# Patient Record
Sex: MALE | Race: WHITE | NOT HISPANIC OR LATINO | Employment: OTHER | URBAN - METROPOLITAN AREA
[De-identification: names, ages, dates, MRNs, and addresses within clinical notes are randomized per-mention and may not be internally consistent; named-entity substitution may affect disease eponyms.]

---

## 2017-06-19 ENCOUNTER — OFFICE VISIT (OUTPATIENT)
Dept: INTERNAL MEDICINE | Facility: CLINIC | Age: 42
End: 2017-06-19

## 2017-06-19 VITALS
WEIGHT: 215.2 LBS | SYSTOLIC BLOOD PRESSURE: 142 MMHG | DIASTOLIC BLOOD PRESSURE: 82 MMHG | OXYGEN SATURATION: 99 % | HEART RATE: 89 BPM | BODY MASS INDEX: 29.15 KG/M2 | HEIGHT: 72 IN

## 2017-06-19 DIAGNOSIS — Z13.220 SCREENING FOR HYPERLIPIDEMIA: ICD-10-CM

## 2017-06-19 DIAGNOSIS — F10.10 ALCOHOL ABUSE: ICD-10-CM

## 2017-06-19 DIAGNOSIS — R07.2 PRECORDIAL PAIN: ICD-10-CM

## 2017-06-19 DIAGNOSIS — R00.2 INTERMITTENT PALPITATIONS: Primary | ICD-10-CM

## 2017-06-19 DIAGNOSIS — R01.1 HEART MURMUR: ICD-10-CM

## 2017-06-19 DIAGNOSIS — K21.9 GASTROESOPHAGEAL REFLUX DISEASE WITHOUT ESOPHAGITIS: ICD-10-CM

## 2017-06-19 DIAGNOSIS — Z13.1 SCREENING FOR DIABETES MELLITUS: ICD-10-CM

## 2017-06-19 DIAGNOSIS — IMO0001 ELEVATED BLOOD PRESSURE: ICD-10-CM

## 2017-06-19 PROBLEM — M10.9 GOUT INVOLVING TOE OF LEFT FOOT: Status: ACTIVE | Noted: 2017-06-19

## 2017-06-19 PROCEDURE — 99204 OFFICE O/P NEW MOD 45 MIN: CPT | Performed by: INTERNAL MEDICINE

## 2017-06-19 PROCEDURE — 93000 ELECTROCARDIOGRAM COMPLETE: CPT | Performed by: INTERNAL MEDICINE

## 2017-06-19 RX ORDER — OMEPRAZOLE 40 MG/1
40 CAPSULE, DELAYED RELEASE ORAL DAILY
COMMUNITY
End: 2017-07-03

## 2017-06-19 RX ORDER — ALLOPURINOL 100 MG/1
100 TABLET ORAL 2 TIMES DAILY
Refills: 5 | COMMUNITY
Start: 2017-05-09

## 2017-06-19 NOTE — PROGRESS NOTES
"Beck Vazquez is a 42 y.o. male, who presents with a chief complaint of   Chief Complaint   Patient presents with   • Establish Care   • Nasal Congestion     x4 days, sore throat, pt states feeling better   • Cough     \"dry cough\" since winter    • Chest Pain   • Palpitations       HPI Comments: Patient is here to establish care and has not seen a primary care doctor in 7 years. He has had dry cough for 6 months. He was thought to have GERD by a ENT doctor and was started on PPI which has helped. He states that he still eats a bad diet. All of his problems are new to me.     He has chest pain for months on the left side. Stress causes the pain to come on. The pain is a tingle and will last seconds. The pain goes away on own and worse when he exerts himself. He had full physical about 6 years ago and he had high TGs in the past, but has otherwise not seen a physician. He drinks about 4-6 beers per night. Never a TOB smoker, but does smoke marijuana.     He does snore at night and does not wake him self up. He has elevated BP here, but does not have SOB or dizziness. He is not sure if his BP has been elevated in the past.        The following portions of the patient's history were reviewed and updated as appropriate: allergies, current medications, past family history, past medical history, past social history, past surgical history and problem list.    Allergies: Review of patient's allergies indicates no known allergies.    Current Outpatient Prescriptions:   •  allopurinol (ZYLOPRIM) 100 MG tablet, Take 100 mg by mouth 2 (Two) Times a Day., Disp: , Rfl: 5  •  omeprazole (priLOSEC) 40 MG capsule, Take 40 mg by mouth Daily., Disp: , Rfl:       Review of Systems   Constitutional: Negative for chills, fatigue and fever.   HENT: Negative for congestion and rhinorrhea.    Respiratory: Negative for cough and shortness of breath.    Cardiovascular: Positive for chest pain and palpitations. Negative for leg " "swelling.   Gastrointestinal: Negative for diarrhea, nausea and vomiting.   Genitourinary: Negative for difficulty urinating and dysuria.   Musculoskeletal: Negative for arthralgias.   Skin: Negative for rash.   Neurological: Negative for dizziness and headaches.   Hematological: Negative for adenopathy.       Objective     /82 (BP Location: Left arm, Patient Position: Sitting, Cuff Size: Adult)  Pulse 89  Ht 72\" (182.9 cm)  Wt 215 lb 3.2 oz (97.6 kg)  SpO2 99%  BMI 29.19 kg/m2        Physical Exam   Constitutional: He is oriented to person, place, and time. He appears well-developed and well-nourished. No distress.   HENT:   Head: Normocephalic and atraumatic.   Right Ear: Tympanic membrane and external ear normal.   Left Ear: Tympanic membrane and external ear normal.   Nose: Nose normal.   Mouth/Throat: Uvula is midline, oropharynx is clear and moist and mucous membranes are normal. No oropharyngeal exudate. Tonsils are 0 on the right. Tonsils are 0 on the left. No tonsillar exudate.   Eyes: Conjunctivae are normal. Right eye exhibits no discharge. Left eye exhibits no discharge. No scleral icterus.   Neck: Neck supple.   Cardiovascular: Normal rate and regular rhythm.  Exam reveals no gallop and no friction rub.    Murmur heard.   Systolic (Geary best left of the lower sternal area) murmur is present with a grade of 2/6   Pulmonary/Chest: Effort normal and breath sounds normal. No respiratory distress. He has no wheezes. He has no rales.   Abdominal: Soft. Bowel sounds are normal. He exhibits no distension and no mass. There is no tenderness. There is no guarding.   Musculoskeletal: He exhibits no edema or tenderness.   Lymphadenopathy:     He has no cervical adenopathy.   Neurological: He is alert and oriented to person, place, and time. No cranial nerve deficit. He exhibits normal muscle tone.   Skin: Skin is warm. No rash noted.   Psychiatric: He has a normal mood and affect. His behavior is " normal.   Nursing note and vitals reviewed.      ECG 12 Lead  Date/Time: 6/19/2017 4:42 PM  Performed by: TRACY VAZQUEZ  Authorized by: TRACY VAZQUEZ   Comparison: not compared with previous ECG   Previous ECG: no previous ECG available  Rhythm: sinus rhythm  Rate: normal  ST Segments: ST segments normal  T Waves: T waves normal  QRS axis: normal  Other: no other findings  Clinical impression: normal ECG              No results found for this or any previous visit.    Assessment/Plan   David was seen today for establish care, nasal congestion, cough, chest pain and palpitations.    Diagnoses and all orders for this visit:    Intermittent palpitations  -     CBC & Differential  -     Comprehensive Metabolic Panel  -     TSH  -     T4, Free  -     Urinalysis With / Culture If Indicated  -     Holter Monitor - 24 Hour  -     Echocardiogram Doppler    Heart murmur  -     Echocardiogram Doppler    Elevated blood pressure    Gastroesophageal reflux disease without esophagitis    Precordial pain  -     CBC & Differential  -     Comprehensive Metabolic Panel  -     ECG 12 Lead  -     Echocardiogram Doppler    Alcohol abuse    Screening for hyperlipidemia  -     Lipid Panel With LDL / HDL Ratio    Screening for diabetes mellitus  -     Comprehensive Metabolic Panel      I am concerned with his intermittent chest pain (that sounds typical at times) and his palpitations that he needs further workup. EKG was normal here today. Will send for echo and Holter to work up palpitations and heart murmur. If still having pain, I do think that he needs a stress test and will do okay with treadmill stress. Risk factors include his inactivity, family history and obesity.     His dry cough does seem to be related to GERD. I have encouraged him to lose weight and stop drinking as much as he is to see if this will help.    He also has elevated BP which is a new diagnosis. He likely has untreated HTN, but will start workup for CP and  palpations and start DASH diet and bring him back for follow up. If still high, will start on BP medication likely lisinopril or BB give that he has gout and will not do well with HCTZ or diuretic.     Return in about 2 weeks (around 7/3/2017) for Recheck of CP .    Deborah Lindsey MD  06/19/2017

## 2017-06-19 NOTE — PATIENT INSTRUCTIONS
"DASH Eating Plan  DASH stands for \"Dietary Approaches to Stop Hypertension.\" The DASH eating plan is a healthy eating plan that has been shown to reduce high blood pressure (hypertension). Additional health benefits may include reducing the risk of type 2 diabetes mellitus, heart disease, and stroke. The DASH eating plan may also help with weight loss.  WHAT DO I NEED TO KNOW ABOUT THE DASH EATING PLAN?  For the DASH eating plan, you will follow these general guidelines:  · Choose foods with less than 150 milligrams of sodium per serving (as listed on the food label).  · Use salt-free seasonings or herbs instead of table salt or sea salt.  · Check with your health care provider or pharmacist before using salt substitutes.  · Eat lower-sodium products. These are often labeled as \"low-sodium\" or \"no salt added.\"  · Eat fresh foods. Avoid eating a lot of canned foods.  · Eat more vegetables, fruits, and low-fat dairy products.  · Choose whole grains. Look for the word \"whole\" as the first word in the ingredient list.  · Choose fish and skinless chicken or turkey more often than red meat. Limit fish, poultry, and meat to 6 oz (170 g) each day.  · Limit sweets, desserts, sugars, and sugary drinks.  · Choose heart-healthy fats.  · Eat more home-cooked food and less restaurant, buffet, and fast food.  · Limit fried foods.  · Do not kimball foods. Cook foods using methods such as baking, boiling, grilling, and broiling instead.  · When eating at a restaurant, ask that your food be prepared with less salt, or no salt if possible.  WHAT FOODS CAN I EAT?  Seek help from a dietitian for individual calorie needs.  Grains  Whole grain or whole wheat bread. Brown rice. Whole grain or whole wheat pasta. Quinoa, bulgur, and whole grain cereals. Low-sodium cereals. Corn or whole wheat flour tortillas. Whole grain cornbread. Whole grain crackers. Low-sodium crackers.  Vegetables  Fresh or frozen vegetables (raw, steamed, roasted, or " grilled). Low-sodium or reduced-sodium tomato and vegetable juices. Low-sodium or reduced-sodium tomato sauce and paste. Low-sodium or reduced-sodium canned vegetables.   Fruits  All fresh, canned (in natural juice), or frozen fruits.  Meat and Other Protein Products  Ground beef (85% or leaner), grass-fed beef, or beef trimmed of fat. Skinless chicken or turkey. Ground chicken or turkey. Pork trimmed of fat. All fish and seafood. Eggs. Dried beans, peas, or lentils. Unsalted nuts and seeds. Unsalted canned beans.  Dairy  Low-fat dairy products, such as skim or 1% milk, 2% or reduced-fat cheeses, low-fat ricotta or cottage cheese, or plain low-fat yogurt. Low-sodium or reduced-sodium cheeses.  Fats and Oils  Tub margarines without trans fats. Light or reduced-fat mayonnaise and salad dressings (reduced sodium). Avocado. Safflower, olive, or canola oils. Natural peanut or almond butter.  Other  Unsalted popcorn and pretzels.  The items listed above may not be a complete list of recommended foods or beverages. Contact your dietitian for more options.  WHAT FOODS ARE NOT RECOMMENDED?  Grains  White bread. White pasta. White rice. Refined cornbread. Bagels and croissants. Crackers that contain trans fat.  Vegetables  Creamed or fried vegetables. Vegetables in a cheese sauce. Regular canned vegetables. Regular canned tomato sauce and paste. Regular tomato and vegetable juices.  Fruits  Canned fruit in light or heavy syrup. Fruit juice.  Meat and Other Protein Products  Fatty cuts of meat. Ribs, chicken wings, camarena, sausage, bologna, salami, chitterlings, fatback, hot dogs, bratwurst, and packaged luncheon meats. Salted nuts and seeds. Canned beans with salt.  Dairy  Whole or 2% milk, cream, half-and-half, and cream cheese. Whole-fat or sweetened yogurt. Full-fat cheeses or blue cheese. Nondairy creamers and whipped toppings. Processed cheese, cheese spreads, or cheese curds.  Condiments  Onion and garlic salt, seasoned  salt, table salt, and sea salt. Canned and packaged gravies. Worcestershire sauce. Tartar sauce. Barbecue sauce. Teriyaki sauce. Soy sauce, including reduced sodium. Steak sauce. Fish sauce. Oyster sauce. Cocktail sauce. Horseradish. Ketchup and mustard. Meat flavorings and tenderizers. Bouillon cubes. Hot sauce. Tabasco sauce. Marinades. Taco seasonings. Relishes.  Fats and Oils  Butter, stick margarine, lard, shortening, ghee, and camarena fat. Coconut, palm kernel, or palm oils. Regular salad dressings.  Other  Pickles and olives. Salted popcorn and pretzels.  The items listed above may not be a complete list of foods and beverages to avoid. Contact your dietitian for more information.  WHERE CAN I FIND MORE INFORMATION?  National [employer], [employer], [employer] [place]: www.nhlbi.nih.gov/health/health-topics/topics/dash/     This information is not intended to replace advice given to you by your health care provider. Make sure you discuss any questions you have with your health care provider.     Document Released: 12/06/2012 Document Revised: 04/10/2017 Document Reviewed: 10/22/2014  Elsevier Interactive Patient Education ©2017 [employer]

## 2017-06-27 ENCOUNTER — TELEPHONE (OUTPATIENT)
Dept: INTERNAL MEDICINE | Facility: CLINIC | Age: 42
End: 2017-06-27

## 2017-06-27 LAB
ALBUMIN SERPL-MCNC: 5 G/DL (ref 3.5–5.5)
ALBUMIN/GLOB SERPL: 2.3 {RATIO} (ref 1.2–2.2)
ALP SERPL-CCNC: 59 IU/L (ref 39–117)
ALT SERPL-CCNC: 85 IU/L (ref 0–44)
APPEARANCE UR: CLEAR
AST SERPL-CCNC: 56 IU/L (ref 0–40)
BACTERIA #/AREA URNS HPF: NORMAL /[HPF]
BASOPHILS # BLD AUTO: 0.1 X10E3/UL (ref 0–0.2)
BASOPHILS NFR BLD AUTO: 2 %
BILIRUB SERPL-MCNC: 0.4 MG/DL (ref 0–1.2)
BILIRUB UR QL STRIP: NEGATIVE
BUN SERPL-MCNC: 18 MG/DL (ref 6–24)
BUN/CREAT SERPL: 18 (ref 9–20)
CALCIUM SERPL-MCNC: 9.5 MG/DL (ref 8.7–10.2)
CHLORIDE SERPL-SCNC: 101 MMOL/L (ref 96–106)
CHOLEST SERPL-MCNC: 215 MG/DL (ref 100–199)
CO2 SERPL-SCNC: 25 MMOL/L (ref 18–29)
COLOR UR: YELLOW
CREAT SERPL-MCNC: 1 MG/DL (ref 0.76–1.27)
EOSINOPHIL # BLD AUTO: 0.1 X10E3/UL (ref 0–0.4)
EOSINOPHIL NFR BLD AUTO: 2 %
EPI CELLS #/AREA URNS HPF: NORMAL /HPF
ERYTHROCYTE [DISTWIDTH] IN BLOOD BY AUTOMATED COUNT: 13.4 % (ref 12.3–15.4)
GLOBULIN SER CALC-MCNC: 2.2 G/DL (ref 1.5–4.5)
GLUCOSE SERPL-MCNC: 93 MG/DL (ref 65–99)
GLUCOSE UR QL: NEGATIVE
HCT VFR BLD AUTO: 41.6 % (ref 37.5–51)
HDLC SERPL-MCNC: 47 MG/DL
HGB BLD-MCNC: 14 G/DL (ref 12.6–17.7)
HGB UR QL STRIP: NEGATIVE
IMM GRANULOCYTES # BLD: 0 X10E3/UL (ref 0–0.1)
IMM GRANULOCYTES NFR BLD: 0 %
KETONES UR QL STRIP: NEGATIVE
LDLC SERPL CALC-MCNC: 125 MG/DL (ref 0–99)
LDLC/HDLC SERPL: 2.7 RATIO UNITS (ref 0–3.6)
LEUKOCYTE ESTERASE UR QL STRIP: NEGATIVE
LYMPHOCYTES # BLD AUTO: 1.9 X10E3/UL (ref 0.7–3.1)
LYMPHOCYTES NFR BLD AUTO: 37 %
MCH RBC QN AUTO: 30.3 PG (ref 26.6–33)
MCHC RBC AUTO-ENTMCNC: 33.7 G/DL (ref 31.5–35.7)
MCV RBC AUTO: 90 FL (ref 79–97)
MICRO URNS: NORMAL
MICRO URNS: NORMAL
MONOCYTES # BLD AUTO: 0.6 X10E3/UL (ref 0.1–0.9)
MONOCYTES NFR BLD AUTO: 12 %
MUCOUS THREADS URNS QL MICRO: PRESENT
NEUTROPHILS # BLD AUTO: 2.4 X10E3/UL (ref 1.4–7)
NEUTROPHILS NFR BLD AUTO: 47 %
NITRITE UR QL STRIP: NEGATIVE
PH UR STRIP: 6 [PH] (ref 5–7.5)
PLATELET # BLD AUTO: 190 X10E3/UL (ref 150–379)
POTASSIUM SERPL-SCNC: 4.5 MMOL/L (ref 3.5–5.2)
PROT SERPL-MCNC: 7.2 G/DL (ref 6–8.5)
PROT UR QL STRIP: NEGATIVE
RBC # BLD AUTO: 4.62 X10E6/UL (ref 4.14–5.8)
RBC #/AREA URNS HPF: NORMAL /HPF
SODIUM SERPL-SCNC: 142 MMOL/L (ref 134–144)
SP GR UR: 1.02 (ref 1–1.03)
T4 FREE SERPL-MCNC: 1 NG/DL (ref 0.82–1.77)
TRIGL SERPL-MCNC: 217 MG/DL (ref 0–149)
TSH SERPL DL<=0.005 MIU/L-ACNC: 3.52 UIU/ML (ref 0.45–4.5)
URINALYSIS REFLEX: NORMAL
UROBILINOGEN UR STRIP-MCNC: 0.2 MG/DL (ref 0.2–1)
VLDLC SERPL CALC-MCNC: 43 MG/DL (ref 5–40)
WBC # BLD AUTO: 5.1 X10E3/UL (ref 3.4–10.8)
WBC #/AREA URNS HPF: NORMAL /HPF

## 2017-06-27 NOTE — TELEPHONE ENCOUNTER
----- Message from Deborah Lindsey MD sent at 6/27/2017  7:50 AM EDT -----  Please call patient with these results and let him know that they look fairly good, but his liver enzymes and cholesterol are both elevated which will be improved with diet and exercise and reducing the alcohol that he drinks. We will discuss more next week about rechecking labs and management.   Pt given lab results.[email or web address]

## 2017-06-27 NOTE — PROGRESS NOTES
Please call patient with these results and let him know that they look fairly good, but his liver enzymes and cholesterol are both elevated which will be improved with diet and exercise and reducing the alcohol that he drinks. We will discuss more next week about rechecking labs and management.

## 2017-06-28 ENCOUNTER — HOSPITAL ENCOUNTER (OUTPATIENT)
Dept: CARDIOLOGY | Facility: HOSPITAL | Age: 42
Discharge: HOME | End: 2017-06-28
Admitting: INTERNAL MEDICINE

## 2017-06-28 ENCOUNTER — HOSPITAL ENCOUNTER (OUTPATIENT)
Dept: CARDIOLOGY | Facility: HOSPITAL | Age: 42
Discharge: HOME | End: 2017-06-28

## 2017-06-28 VITALS
WEIGHT: 215 LBS | BODY MASS INDEX: 29.12 KG/M2 | DIASTOLIC BLOOD PRESSURE: 93 MMHG | HEIGHT: 72 IN | SYSTOLIC BLOOD PRESSURE: 142 MMHG | HEART RATE: 70 BPM

## 2017-06-28 LAB
AORTIC ARCH: 2.6 CM
ASCENDING AORTA: 3.1 CM
BH CV ECHO MEAS - ACS: 1.9 CM
BH CV ECHO MEAS - AO MAX PG: 8 MMHG
BH CV ECHO MEAS - AO MEAN PG (FULL): 1 MMHG
BH CV ECHO MEAS - AO MEAN PG: 4 MMHG
BH CV ECHO MEAS - AO ROOT AREA (BSA CORRECTED): 1.5
BH CV ECHO MEAS - AO ROOT AREA: 8 CM^2
BH CV ECHO MEAS - AO ROOT DIAM: 3.2 CM
BH CV ECHO MEAS - AO V2 MAX: 142 CM/SEC
BH CV ECHO MEAS - AO V2 MEAN: 92.9 CM/SEC
BH CV ECHO MEAS - AO V2 VTI: 27.7 CM
BH CV ECHO MEAS - ASC AORTA: 3.1 CM
BH CV ECHO MEAS - AVA(I,A): 2.6 CM^2
BH CV ECHO MEAS - AVA(I,D): 2.6 CM^2
BH CV ECHO MEAS - BSA(HAYCOCK): 2.2 M^2
BH CV ECHO MEAS - BSA: 2.2 M^2
BH CV ECHO MEAS - BZI_BMI: 29.2 KILOGRAMS/M^2
BH CV ECHO MEAS - BZI_METRIC_HEIGHT: 182.9 CM
BH CV ECHO MEAS - BZI_METRIC_WEIGHT: 97.5 KG
BH CV ECHO MEAS - CONTRAST EF (2CH): 65.4 ML/M^2
BH CV ECHO MEAS - CONTRAST EF 4CH: 67 ML/M^2
BH CV ECHO MEAS - EDV(CUBED): 103.8 ML
BH CV ECHO MEAS - EDV(MOD-SP2): 81 ML
BH CV ECHO MEAS - EDV(MOD-SP4): 100 ML
BH CV ECHO MEAS - EDV(TEICH): 102.4 ML
BH CV ECHO MEAS - EF(CUBED): 78.9 %
BH CV ECHO MEAS - EF(MOD-SP2): 65.4 %
BH CV ECHO MEAS - EF(MOD-SP4): 67 %
BH CV ECHO MEAS - EF(TEICH): 71.1 %
BH CV ECHO MEAS - ESV(CUBED): 22 ML
BH CV ECHO MEAS - ESV(MOD-SP2): 28 ML
BH CV ECHO MEAS - ESV(MOD-SP4): 33 ML
BH CV ECHO MEAS - ESV(TEICH): 29.6 ML
BH CV ECHO MEAS - FS: 40.4 %
BH CV ECHO MEAS - IVS/LVPW: 1.1
BH CV ECHO MEAS - IVSD: 1 CM
BH CV ECHO MEAS - LAT PEAK E' VEL: 13 CM/SEC
BH CV ECHO MEAS - LV DIASTOLIC VOL/BSA (35-75): 45.5 ML/M^2
BH CV ECHO MEAS - LV MASS(C)D: 153.4 GRAMS
BH CV ECHO MEAS - LV MASS(C)DI: 69.8 GRAMS/M^2
BH CV ECHO MEAS - LV MEAN PG: 3 MMHG
BH CV ECHO MEAS - LV SYSTOLIC VOL/BSA (12-30): 15 ML/M^2
BH CV ECHO MEAS - LV V1 MAX: 129 CM/SEC
BH CV ECHO MEAS - LV V1 MEAN: 76.7 CM/SEC
BH CV ECHO MEAS - LV V1 VTI: 22.5 CM
BH CV ECHO MEAS - LVIDD: 4.7 CM
BH CV ECHO MEAS - LVIDS: 2.8 CM
BH CV ECHO MEAS - LVLD AP2: 7.6 CM
BH CV ECHO MEAS - LVLD AP4: 7.9 CM
BH CV ECHO MEAS - LVLS AP2: 6.7 CM
BH CV ECHO MEAS - LVLS AP4: 6.5 CM
BH CV ECHO MEAS - LVOT AREA (M): 3.1 CM^2
BH CV ECHO MEAS - LVOT AREA: 3.1 CM^2
BH CV ECHO MEAS - LVOT DIAM: 2 CM
BH CV ECHO MEAS - LVPWD: 0.9 CM
BH CV ECHO MEAS - MED PEAK E' VEL: 9 CM/SEC
BH CV ECHO MEAS - MV A DUR: 0.12 SEC
BH CV ECHO MEAS - MV A MAX VEL: 90.3 CM/SEC
BH CV ECHO MEAS - MV DEC SLOPE: 480 CM/SEC^2
BH CV ECHO MEAS - MV DEC TIME: 0.16 SEC
BH CV ECHO MEAS - MV E MAX VEL: 99.7 CM/SEC
BH CV ECHO MEAS - MV E/A: 1.1
BH CV ECHO MEAS - MV MAX PG: 4 MMHG
BH CV ECHO MEAS - MV MEAN PG: 2 MMHG
BH CV ECHO MEAS - MV P1/2T MAX VEL: 114 CM/SEC
BH CV ECHO MEAS - MV P1/2T: 69.6 MSEC
BH CV ECHO MEAS - MV V2 MEAN: 62.5 CM/SEC
BH CV ECHO MEAS - MV V2 VTI: 26.3 CM
BH CV ECHO MEAS - MVA P1/2T LCG: 1.9 CM^2
BH CV ECHO MEAS - MVA(P1/2T): 3.2 CM^2
BH CV ECHO MEAS - MVA(VTI): 2.7 CM^2
BH CV ECHO MEAS - PA ACC SLOPE: 82.6 CM/SEC^2
BH CV ECHO MEAS - PA ACC TIME: 0.1 SEC
BH CV ECHO MEAS - PA MAX PG: 7.5 MMHG
BH CV ECHO MEAS - PA PR(ACCEL): 34.5 MMHG
BH CV ECHO MEAS - PA V2 MAX: 137 CM/SEC
BH CV ECHO MEAS - PULM A REVS DUR: 0.11 SEC
BH CV ECHO MEAS - PULM A REVS VEL: 42 CM/SEC
BH CV ECHO MEAS - PULM DIAS VEL: 55 CM/SEC
BH CV ECHO MEAS - PULM S/D: 1.2
BH CV ECHO MEAS - PULM SYS VEL: 68 CM/SEC
BH CV ECHO MEAS - QP/QS: 0.75
BH CV ECHO MEAS - RAP SYSTOLE: 3 MMHG
BH CV ECHO MEAS - RV MEAN PG: 1 MMHG
BH CV ECHO MEAS - RV V1 MEAN: 51.5 CM/SEC
BH CV ECHO MEAS - RV V1 VTI: 16.8 CM
BH CV ECHO MEAS - RVOT AREA: 3.1 CM^2
BH CV ECHO MEAS - RVOT DIAM: 2 CM
BH CV ECHO MEAS - RVSP: 29 MMHG
BH CV ECHO MEAS - SI(AO): 101.4 ML/M^2
BH CV ECHO MEAS - SI(CUBED): 37.3 ML/M^2
BH CV ECHO MEAS - SI(LVOT): 32.2 ML/M^2
BH CV ECHO MEAS - SI(MOD-SP2): 24.1 ML/M^2
BH CV ECHO MEAS - SI(MOD-SP4): 30.5 ML/M^2
BH CV ECHO MEAS - SI(TEICH): 33.1 ML/M^2
BH CV ECHO MEAS - SUP REN AO DIAM: 1.8 CM
BH CV ECHO MEAS - SV(AO): 222.8 ML
BH CV ECHO MEAS - SV(CUBED): 81.9 ML
BH CV ECHO MEAS - SV(LVOT): 70.7 ML
BH CV ECHO MEAS - SV(MOD-SP2): 53 ML
BH CV ECHO MEAS - SV(MOD-SP4): 67 ML
BH CV ECHO MEAS - SV(RVOT): 52.8 ML
BH CV ECHO MEAS - SV(TEICH): 72.8 ML
BH CV ECHO MEAS - TAPSE (>1.6): 2.5 CM2
BH CV ECHO MEAS - TR MAX VEL: 257 CM/SEC
BH CV VAS BP RIGHT ARM: NORMAL MMHG
BH CV XLRA - RV BASE: 3.1 CM
BH CV XLRA - TDI S': 14 CM/SEC
E/E' RATIO: 10
LEFT ATRIUM VOLUME INDEX: 15 ML/M2
LV EF 2D ECHO EST: 67 %

## 2017-06-28 PROCEDURE — 93306 TTE W/DOPPLER COMPLETE: CPT | Performed by: INTERNAL MEDICINE

## 2017-06-28 PROCEDURE — 93306 TTE W/DOPPLER COMPLETE: CPT

## 2017-06-28 PROCEDURE — 93225 XTRNL ECG REC<48 HRS REC: CPT

## 2017-06-28 PROCEDURE — 93226 XTRNL ECG REC<48 HR SCAN A/R: CPT

## 2017-06-29 ENCOUNTER — TELEPHONE (OUTPATIENT)
Dept: INTERNAL MEDICINE | Facility: CLINIC | Age: 42
End: 2017-06-29

## 2017-06-29 NOTE — TELEPHONE ENCOUNTER
Patient has been advised and voiced understanding.     ----- Message from Deborah Lindsey MD sent at 6/29/2017  8:01 AM EDT -----  Please call patient with these results and let him know that his echo was normal. We will discuss in more detail when he returns for follow up with me next week.

## 2017-06-29 NOTE — PROGRESS NOTES
Please call patient with these results and let him know that his echo was normal. We will discuss in more detail when he returns for follow up with me next week.

## 2017-07-02 PROCEDURE — 93227 XTRNL ECG REC<48 HR R&I: CPT | Performed by: INTERNAL MEDICINE

## 2017-07-03 ENCOUNTER — OFFICE VISIT (OUTPATIENT)
Dept: INTERNAL MEDICINE | Facility: CLINIC | Age: 42
End: 2017-07-03

## 2017-07-03 VITALS
WEIGHT: 210.6 LBS | DIASTOLIC BLOOD PRESSURE: 82 MMHG | HEIGHT: 72 IN | SYSTOLIC BLOOD PRESSURE: 134 MMHG | HEART RATE: 72 BPM | BODY MASS INDEX: 28.53 KG/M2 | OXYGEN SATURATION: 98 %

## 2017-07-03 DIAGNOSIS — E78.2 MIXED HYPERLIPIDEMIA: ICD-10-CM

## 2017-07-03 DIAGNOSIS — F10.10 ALCOHOL ABUSE: ICD-10-CM

## 2017-07-03 DIAGNOSIS — R00.2 INTERMITTENT PALPITATIONS: ICD-10-CM

## 2017-07-03 DIAGNOSIS — IMO0001 ELEVATED BLOOD PRESSURE: ICD-10-CM

## 2017-07-03 DIAGNOSIS — R74.8 ELEVATED LIVER ENZYMES: ICD-10-CM

## 2017-07-03 DIAGNOSIS — R05.3 CHRONIC COUGH: Primary | ICD-10-CM

## 2017-07-03 PROBLEM — R01.1 HEART MURMUR: Status: RESOLVED | Noted: 2017-06-19 | Resolved: 2017-07-03

## 2017-07-03 PROCEDURE — 99214 OFFICE O/P EST MOD 30 MIN: CPT | Performed by: INTERNAL MEDICINE

## 2017-07-03 NOTE — PROGRESS NOTES
"Beck Vazquez is a 42 y.o. male, who presents with a chief complaint of   Chief Complaint   Patient presents with   • Follow-up     2 wk f/u, pt states \"dry cough\" is still an issue    • Chest Pain     pt states he has not had an episode since the last office visit        HPI Comments: 41 yo M here for follow up of palpitations. These have resolved since getting Holter and echo which I reviewed with in the office. Labs were reviewed and are normal except LFT's and cholesterol. This is not worse with exertion .     Patient has had dry cough all winter. It is worse during the day or when he is at the barn around dust and the horses. He has tickle in the back of his throat. PPI has not helped. No fever or chills and has not noted any SOB.     He is still drinking about 6 beers per day and sometimes binging. He does not drink enough water and eats mostly bad food.        The following portions of the patient's history were reviewed and updated as appropriate: allergies, current medications, past family history, past medical history, past social history, past surgical history and problem list.    Allergies: Review of patient's allergies indicates no known allergies.    Current Outpatient Prescriptions:   •  allopurinol (ZYLOPRIM) 100 MG tablet, Take 100 mg by mouth 2 (Two) Times a Day., Disp: , Rfl: 5  Medications Discontinued During This Encounter   Medication Reason   • omeprazole (priLOSEC) 40 MG capsule        Review of Systems   Constitutional: Negative for chills and fever.   Respiratory: Positive for cough. Negative for shortness of breath and wheezing.    Cardiovascular: Negative for chest pain, palpitations and leg swelling.   Genitourinary: Negative for difficulty urinating and dysuria.   Skin: Negative for rash.       Objective     /82 (BP Location: Left arm, Patient Position: Sitting, Cuff Size: Adult)  Pulse 72  Ht 72\" (182.9 cm)  Wt 210 lb 9.6 oz (95.5 kg)  SpO2 98%  BMI 28.56 " kg/m2      Physical Exam   Constitutional: He is oriented to person, place, and time. He appears well-developed and well-nourished. No distress.   HENT:   Head: Normocephalic and atraumatic.   Right Ear: External ear normal.   Left Ear: External ear normal.   Mouth/Throat: Oropharynx is clear and moist. No oropharyngeal exudate.   Eyes: Conjunctivae are normal. Right eye exhibits no discharge. Left eye exhibits no discharge. No scleral icterus.   Neck: Neck supple.   Cardiovascular: Normal rate, regular rhythm and normal heart sounds.  Exam reveals no gallop and no friction rub.    No murmur heard.  Pulmonary/Chest: Effort normal and breath sounds normal. No respiratory distress. He has no wheezes. He has no rales.   Neurological: He is alert and oriented to person, place, and time.   Skin: Skin is warm. No rash noted.   Psychiatric: He has a normal mood and affect. His behavior is normal.   Nursing note and vitals reviewed.        Results for orders placed or performed in visit on 06/19/17   Comprehensive Metabolic Panel   Result Value Ref Range    Glucose 93 65 - 99 mg/dL    BUN 18 6 - 24 mg/dL    Creatinine 1.00 0.76 - 1.27 mg/dL    eGFR Non African Am 92 >59 mL/min/1.73    eGFR African Am 106 >59 mL/min/1.73    BUN/Creatinine Ratio 18 9 - 20    Sodium 142 134 - 144 mmol/L    Potassium 4.5 3.5 - 5.2 mmol/L    Chloride 101 96 - 106 mmol/L    Total CO2 25 18 - 29 mmol/L    Calcium 9.5 8.7 - 10.2 mg/dL    Total Protein 7.2 6.0 - 8.5 g/dL    Albumin 5.0 3.5 - 5.5 g/dL    Globulin 2.2 1.5 - 4.5 g/dL    A/G Ratio 2.3 (H) 1.2 - 2.2    Total Bilirubin 0.4 0.0 - 1.2 mg/dL    Alkaline Phosphatase 59 39 - 117 IU/L    AST (SGOT) 56 (H) 0 - 40 IU/L    ALT (SGPT) 85 (H) 0 - 44 IU/L   Lipid Panel With LDL / HDL Ratio   Result Value Ref Range    Total Cholesterol 215 (H) 100 - 199 mg/dL    Triglycerides 217 (H) 0 - 149 mg/dL    HDL Cholesterol 47 >39 mg/dL    VLDL Cholesterol 43 (H) 5 - 40 mg/dL    LDL Cholesterol  125 (H) 0 -  99 mg/dL    LDL/HDL Ratio 2.7 0.0 - 3.6 ratio units   TSH   Result Value Ref Range    TSH 3.520 0.450 - 4.500 uIU/mL   T4, Free   Result Value Ref Range    Free T4 1.00 0.82 - 1.77 ng/dL   Urinalysis With / Culture If Indicated   Result Value Ref Range    Specific Gravity, UA 1.024 1.005 - 1.030    pH, UA 6.0 5.0 - 7.5    Color, UA Yellow Yellow    Appearance, UA Clear Clear    Leukocytes, UA Negative Negative    Protein Negative Negative/Trace    Glucose, UA Negative Negative    Ketones Negative Negative    Blood, UA Negative Negative    Bilirubin, UA Negative Negative    Urobilinogen, UA 0.2 0.2 - 1.0 mg/dL    Nitrite, UA Negative Negative    Microscopic Examination Comment     MICROSCOPIC EXAMINATION See below:     Urinalysis Reflex Comment    Microscopic Examination   Result Value Ref Range    WBC, UA 0-5 0 - 5 /hpf    RBC, UA 0-2 0 - 2 /hpf    Epithelial Cells (non renal) None seen 0 - 10 /hpf    Mucus, UA Present Not Estab.    Bacteria, UA None seen None seen/Few   Adult Transthoracic Echo Complete   Result Value Ref Range    BSA 2.2 m^2    IVSd 1.0 cm    LVIDd 4.7 cm    LVIDs 2.8 cm    LVPWd 0.9 cm    IVS/LVPW 1.1     FS 40.4 %    EDV(Teich) 102.4 ml    ESV(Teich) 29.6 ml    EF(Teich) 71.1 %    EDV(cubed) 103.8 ml    ESV(cubed) 22.0 ml    EF(cubed) 78.9 %    LV mass(C)d 153.4 grams    LV mass(C)dI 69.8 grams/m^2    SV(Teich) 72.8 ml    SI(Teich) 33.1 ml/m^2    SV(cubed) 81.9 ml    SI(cubed) 37.3 ml/m^2    Ao root diam 3.2 cm    Ao root area 8.0 cm^2    ACS 1.9 cm    asc Aorta Diam 3.1 cm    LVOT diam 2.0 cm    LVOT area 3.1 cm^2    LVOT area(traced) 3.1 cm^2    RVOT diam 2.0 cm    RVOT area 3.1 cm^2    LVLd ap4 7.9 cm    EDV(MOD-sp4) 100.0 ml    LVLs ap4 6.5 cm    ESV(MOD-sp4) 33.0 ml    EF(MOD-sp4) 67.0 %    LVLd ap2 7.6 cm    EDV(MOD-sp2) 81.0 ml    LVLs ap2 6.7 cm    ESV(MOD-sp2) 28.0 ml    EF(MOD-sp2) 65.4 %    SV(MOD-sp4) 67.0 ml    SI(MOD-sp4) 30.5 ml/m^2    SV(MOD-sp2) 53.0 ml    SI(MOD-sp2) 24.1  ml/m^2    Ao root area (BSA corrected) 1.5     Ao root area (BSA corrected) 65.4 ml/m^2    CONTRAST EF 4CH 67.0 ml/m^2    LV Diastolic corrected for BSA 45.5 ml/m^2    LV Systolic corrected for BSA 15.0 ml/m^2    MV A dur 0.12 sec    MV E max zach 99.7 cm/sec    MV A max zach 90.3 cm/sec    MV E/A 1.1     MV V2 mean 62.5 cm/sec    MV mean PG 2.0 mmHg    MV V2 VTI 26.3 cm    MVA(VTI) 2.7 cm^2    MV P1/2t max zach 114.0 cm/sec    MV P1/2t 69.6 msec    MVA(P1/2t) 3.2 cm^2    MV dec slope 480.0 cm/sec^2    MV dec time 0.16 sec    Ao V2 mean 92.9 cm/sec    Ao mean PG 4.0 mmHg    Ao mean PG (full) 1.0 mmHg    Ao V2 VTI 27.7 cm    JAILENE(I,A) 2.6 cm^2    JAILENE(I,D) 2.6 cm^2    LV V1 mean PG 3.0 mmHg    LV V1 mean 76.7 cm/sec    LV V1 VTI 22.5 cm    SV(Ao) 222.8 ml    SI(Ao) 101.4 ml/m^2    SV(LVOT) 70.7 ml    SV(RVOT) 52.8 ml    SI(LVOT) 32.2 ml/m^2    PA V2 max 137.0 cm/sec    PA max PG 7.5 mmHg    PA acc slope 82.6 cm/sec^2    PA acc time 0.1 sec    RV V1 mean PG 1.0 mmHg    RV V1 mean 51.5 cm/sec    RV V1 VTI 16.8 cm    TR max zach 257.0 cm/sec    PA pr(Accel) 34.5 mmHg    Pulm Sys Zach 68.0 cm/sec    Pulm Arriola Zach 55.0 cm/sec    Pulm S/D 1.2     Qp/Qs 0.75     Pulm A Revs Dur 0.11 sec    Pulm A Revs Zach 42.0 cm/sec    MVA P1/2T LCG 1.9 cm^2    BH CV ECHO ANGEL - BZI_BMI 29.2 kilograms/m^2    BH CV ECHO ANGEL - BSA(HAYCOCK) 2.2 m^2     CV ECHO ANGEL - BZI_METRIC_WEIGHT 97.5 kg     CV ECHO ANGEL - BZI_METRIC_HEIGHT 182.9 cm     CV VAS BP RIGHT /93 mmHg    TDI S' 14.00 cm/sec    RV Base 3.10 cm    Ascending aorta 3.10 cm    Aortic arch 2.60 cm    E/E' ratio 10.0     LA Volume Index 15.0 mL/m2    Ao pk zach 142.0 cm/sec    Lat Peak E' Zach 13.0 cm/sec    LV V1 max 129.0 cm/sec    Med Peak E' Zach 9.00 cm/sec    RAP systole 3 mmHg    RVSP(TR) 29 mmHg    Sup Clark Ao Diam 1.80 cm    Ao max PG 8.0 mmHg    MV max PG 4 mmHg    TAPSE (>1.6) 2.50 cm2    Echo EF Estimated 67 %   CBC & Differential   Result Value Ref Range    WBC 5.1 3.4  - 10.8 x10E3/uL    RBC 4.62 4.14 - 5.80 x10E6/uL    Hemoglobin 14.0 12.6 - 17.7 g/dL    Hematocrit 41.6 37.5 - 51.0 %    MCV 90 79 - 97 fL    MCH 30.3 26.6 - 33.0 pg    MCHC 33.7 31.5 - 35.7 g/dL    RDW 13.4 12.3 - 15.4 %    Platelets 190 150 - 379 x10E3/uL    Neutrophil Rel % 47 %    Lymphocyte Rel % 37 %    Monocyte Rel % 12 %    Eosinophil Rel % 2 %    Basophil Rel % 2 %    Neutrophils Absolute 2.4 1.4 - 7.0 x10E3/uL    Lymphocytes Absolute 1.9 0.7 - 3.1 x10E3/uL    Monocytes Absolute 0.6 0.1 - 0.9 x10E3/uL    Eosinophils Absolute 0.1 0.0 - 0.4 x10E3/uL    Basophils Absolute 0.1 0.0 - 0.2 x10E3/uL    Immature Granulocyte Rel % 0 %    Immature Grans Absolute 0.0 0.0 - 0.1 x10E3/uL       Assessment/Plan   David was seen today for follow-up and chest pain.    Diagnoses and all orders for this visit:    Chronic cough    Intermittent palpitations    Elevated blood pressure  -     CBC & Differential    Elevated liver enzymes  -     CBC & Differential  -     Comprehensive Metabolic Panel    Mixed hyperlipidemia  -     Lipid Panel With LDL / HDL Ratio    Alcohol abuse    Patient is her for follow of his heart palpitations and chest pain. This has resolved and reviewed echo and holter monitor with him today which was normal. I am happy that he is doing better, but want us to continue to monitor this closely.    I want him to continue to reduce his drinking and diet and this will likely help with his elevated LFT's and TG's. We will recheck in 4 months and if still elevated then, will consider workup.     His chronic cough is likely related to allergies as it seems to be worse in the tito. Will start Zyrtec and see how he does. May consider Flonase and if not better, will consider PFT's and CXR to rule out cough variant asthma. I have also encouraged him to eat better and lose weight as I feel that reflux is likely contributing.     His BP has been elevated, but is going down. I want him to work on diet and exercise and  we will reassess in 4 months. If still elevated, will start medication.      Return in about 4 months (around 11/1/2017) for Recheck.    Deborah Lindsey MD  07/03/2017

## 2018-08-01 DIAGNOSIS — Z00.00 ROUTINE ADULT HEALTH MAINTENANCE: Primary | ICD-10-CM

## 2018-08-01 DIAGNOSIS — E78.2 MIXED HYPERLIPIDEMIA: ICD-10-CM

## 2018-08-01 DIAGNOSIS — Z13.29 SCREENING FOR HYPOTHYROIDISM: ICD-10-CM

## 2018-08-28 LAB
ALBUMIN SERPL-MCNC: 5.1 G/DL (ref 3.5–5.5)
ALBUMIN/GLOB SERPL: 2.3 {RATIO} (ref 1.2–2.2)
ALP SERPL-CCNC: 52 IU/L (ref 39–117)
ALT SERPL-CCNC: 71 IU/L (ref 0–44)
APPEARANCE UR: CLEAR
AST SERPL-CCNC: 46 IU/L (ref 0–40)
BACTERIA #/AREA URNS HPF: NORMAL /[HPF]
BASOPHILS # BLD AUTO: 0.1 X10E3/UL (ref 0–0.2)
BASOPHILS NFR BLD AUTO: 2 %
BILIRUB SERPL-MCNC: 0.7 MG/DL (ref 0–1.2)
BILIRUB UR QL STRIP: NEGATIVE
BUN SERPL-MCNC: 15 MG/DL (ref 6–24)
BUN/CREAT SERPL: 14 (ref 9–20)
CALCIUM SERPL-MCNC: 10 MG/DL (ref 8.7–10.2)
CHLORIDE SERPL-SCNC: 99 MMOL/L (ref 96–106)
CHOLEST SERPL-MCNC: 243 MG/DL (ref 100–199)
CO2 SERPL-SCNC: 24 MMOL/L (ref 20–29)
COLOR UR: YELLOW
CREAT SERPL-MCNC: 1.04 MG/DL (ref 0.76–1.27)
EOSINOPHIL # BLD AUTO: 0.1 X10E3/UL (ref 0–0.4)
EOSINOPHIL NFR BLD AUTO: 3 %
EPI CELLS #/AREA URNS HPF: NORMAL /HPF
ERYTHROCYTE [DISTWIDTH] IN BLOOD BY AUTOMATED COUNT: 13.9 % (ref 12.3–15.4)
GLOBULIN SER CALC-MCNC: 2.2 G/DL (ref 1.5–4.5)
GLUCOSE SERPL-MCNC: 91 MG/DL (ref 65–99)
GLUCOSE UR QL: NEGATIVE
HCT VFR BLD AUTO: 43.7 % (ref 37.5–51)
HDLC SERPL-MCNC: 42 MG/DL
HGB BLD-MCNC: 14.6 G/DL (ref 13–17.7)
HGB UR QL STRIP: NEGATIVE
IMM GRANULOCYTES # BLD: 0 X10E3/UL (ref 0–0.1)
IMM GRANULOCYTES NFR BLD: 0 %
KETONES UR QL STRIP: NEGATIVE
LDLC SERPL CALC-MCNC: 134 MG/DL (ref 0–99)
LDLC/HDLC SERPL: 3.2 RATIO (ref 0–3.6)
LEUKOCYTE ESTERASE UR QL STRIP: NEGATIVE
LYMPHOCYTES # BLD AUTO: 1.9 X10E3/UL (ref 0.7–3.1)
LYMPHOCYTES NFR BLD AUTO: 36 %
MCH RBC QN AUTO: 30.9 PG (ref 26.6–33)
MCHC RBC AUTO-ENTMCNC: 33.4 G/DL (ref 31.5–35.7)
MCV RBC AUTO: 92 FL (ref 79–97)
MICRO URNS: NORMAL
MICRO URNS: NORMAL
MONOCYTES # BLD AUTO: 0.8 X10E3/UL (ref 0.1–0.9)
MONOCYTES NFR BLD AUTO: 15 %
MUCOUS THREADS URNS QL MICRO: PRESENT
NEUTROPHILS # BLD AUTO: 2.3 X10E3/UL (ref 1.4–7)
NEUTROPHILS NFR BLD AUTO: 44 %
NITRITE UR QL STRIP: NEGATIVE
PH UR STRIP: 6 [PH] (ref 5–7.5)
PLATELET # BLD AUTO: 175 X10E3/UL (ref 150–379)
POTASSIUM SERPL-SCNC: 4.6 MMOL/L (ref 3.5–5.2)
PROT SERPL-MCNC: 7.3 G/DL (ref 6–8.5)
PROT UR QL STRIP: NEGATIVE
RBC # BLD AUTO: 4.73 X10E6/UL (ref 4.14–5.8)
RBC #/AREA URNS HPF: NORMAL /HPF
SODIUM SERPL-SCNC: 138 MMOL/L (ref 134–144)
SP GR UR: 1.02 (ref 1–1.03)
TRIGL SERPL-MCNC: 333 MG/DL (ref 0–149)
TSH SERPL DL<=0.005 MIU/L-ACNC: 4.08 UIU/ML (ref 0.45–4.5)
URINALYSIS REFLEX: NORMAL
UROBILINOGEN UR STRIP-MCNC: 0.2 MG/DL (ref 0.2–1)
VLDLC SERPL CALC-MCNC: 67 MG/DL (ref 5–40)
WBC # BLD AUTO: 5.3 X10E3/UL (ref 3.4–10.8)
WBC #/AREA URNS HPF: NORMAL /HPF

## 2018-08-31 ENCOUNTER — OFFICE VISIT (OUTPATIENT)
Dept: INTERNAL MEDICINE | Facility: CLINIC | Age: 43
End: 2018-08-31

## 2018-08-31 VITALS
HEIGHT: 72 IN | TEMPERATURE: 98.5 F | OXYGEN SATURATION: 98 % | HEART RATE: 71 BPM | RESPIRATION RATE: 16 BRPM | WEIGHT: 207 LBS | BODY MASS INDEX: 28.04 KG/M2 | SYSTOLIC BLOOD PRESSURE: 134 MMHG | DIASTOLIC BLOOD PRESSURE: 82 MMHG

## 2018-08-31 DIAGNOSIS — R74.8 ELEVATED LIVER ENZYMES: ICD-10-CM

## 2018-08-31 DIAGNOSIS — R29.818 SUSPECTED SLEEP APNEA: ICD-10-CM

## 2018-08-31 DIAGNOSIS — E78.2 MIXED HYPERLIPIDEMIA: ICD-10-CM

## 2018-08-31 DIAGNOSIS — M1A.0720 CHRONIC IDIOPATHIC GOUT INVOLVING TOE OF LEFT FOOT WITHOUT TOPHUS: ICD-10-CM

## 2018-08-31 DIAGNOSIS — F10.10 ALCOHOL ABUSE: ICD-10-CM

## 2018-08-31 DIAGNOSIS — Z00.00 ENCOUNTER FOR ANNUAL PHYSICAL EXAM: Primary | ICD-10-CM

## 2018-08-31 DIAGNOSIS — R03.0 ELEVATED BLOOD PRESSURE READING: ICD-10-CM

## 2018-08-31 PROBLEM — R07.2 PRECORDIAL PAIN: Status: RESOLVED | Noted: 2017-06-19 | Resolved: 2018-08-31

## 2018-08-31 PROCEDURE — 99213 OFFICE O/P EST LOW 20 MIN: CPT | Performed by: INTERNAL MEDICINE

## 2018-08-31 PROCEDURE — 90715 TDAP VACCINE 7 YRS/> IM: CPT | Performed by: INTERNAL MEDICINE

## 2018-08-31 PROCEDURE — 99396 PREV VISIT EST AGE 40-64: CPT | Performed by: INTERNAL MEDICINE

## 2018-08-31 PROCEDURE — 90471 IMMUNIZATION ADMIN: CPT | Performed by: INTERNAL MEDICINE

## 2018-08-31 RX ORDER — LORATADINE 10 MG/1
1 CAPSULE, LIQUID FILLED ORAL DAILY
COMMUNITY

## 2018-09-12 ENCOUNTER — HOSPITAL ENCOUNTER (OUTPATIENT)
Dept: ULTRASOUND IMAGING | Facility: HOSPITAL | Age: 43
Discharge: HOME | End: 2018-09-12
Admitting: INTERNAL MEDICINE

## 2018-09-12 PROCEDURE — 76705 ECHO EXAM OF ABDOMEN: CPT

## 2018-09-14 ENCOUNTER — APPOINTMENT (OUTPATIENT)
Dept: SLEEP MEDICINE | Facility: HOSPITAL | Age: 43
End: 2018-09-14
Attending: INTERNAL MEDICINE

## 2018-09-18 ENCOUNTER — TELEPHONE (OUTPATIENT)
Dept: INTERNAL MEDICINE | Facility: CLINIC | Age: 43
End: 2018-09-18

## 2018-09-18 DIAGNOSIS — K76.9 LIVER LESION: Primary | ICD-10-CM

## 2018-09-18 NOTE — TELEPHONE ENCOUNTER
Patient has been advised and voiced understanding. Order placed, patient aware.    ----- Message from Deborah Lindsey MD sent at 9/17/2018  7:57 AM EDT -----  The u/s of his liver does show that he has some fatty liver as I suspected, but there are two lesions that are a little over 1cm in size. I want to get a better image of these lesions to make sure that they are nothing worrisome. I think that they are likely just a collection of blood vessels which is benign, but I want to make sure. Please order MRI of the abd with and without contrast to be done in the next several weeks.

## 2018-09-19 RX ORDER — CLONAZEPAM 1 MG/1
TABLET ORAL
Qty: 5 TABLET | Refills: 0 | OUTPATIENT
Start: 2018-09-19 | End: 2018-09-24 | Stop reason: SDUPTHER

## 2018-09-19 NOTE — TELEPHONE ENCOUNTER
RX pending to Dr. Lindsey. M for patient in detail on personal mailbox. Advised patient to return call with any questions or concerns.     ----- Message from Deborah Lindsey MD sent at 9/18/2018  7:50 PM EDT -----  Regarding: RE: CLOSTERPHOBIC  Contact: [phone]  Can you send in Klonopin 1mg PO once PRN 30 minutes prior to MRI. Give #5 with 0 refills just in case he needs a few more or even another that day. Can take up to 2mg daily if needed.    ----- Message -----  From: Beatrice Sharp  Sent: 9/18/2018   4:41 PM  To: Víctor Albarran [facility] [place]  Subject: CLOSTERPHOBIC                                    [facility]OR[facility] PT    Patient called to say that he has MRI scheduled for next Wednesday and he is asking for something to take for his closterphobia.    [facility]      Thanksbeatrice

## 2018-09-24 RX ORDER — CLONAZEPAM 1 MG/1
TABLET ORAL
Qty: 5 TABLET | Refills: 0 | Status: SHIPPED | OUTPATIENT
Start: 2018-09-24

## 2018-09-26 ENCOUNTER — HOSPITAL ENCOUNTER (OUTPATIENT)
Dept: MRI IMAGING | Facility: HOSPITAL | Age: 43
Discharge: HOME | End: 2018-09-26
Admitting: INTERNAL MEDICINE

## 2018-09-26 PROCEDURE — 0 GADOBENATE DIMEGLUMINE 529 MG/ML SOLUTION: Performed by: INTERNAL MEDICINE

## 2018-09-26 PROCEDURE — A9577 INJ MULTIHANCE: HCPCS | Performed by: INTERNAL MEDICINE

## 2018-09-26 PROCEDURE — 74183 MRI ABD W/O CNTR FLWD CNTR: CPT

## 2018-09-26 RX ADMIN — GADOBENATE DIMEGLUMINE 20 ML: 529 INJECTION, SOLUTION INTRAVENOUS at 12:54

## 2018-10-04 ENCOUNTER — TELEPHONE (OUTPATIENT)
Dept: INTERNAL MEDICINE | Facility: CLINIC | Age: 43
End: 2018-10-04

## 2018-10-04 NOTE — TELEPHONE ENCOUNTER
Patient has been advised and voiced understanding.     ----- Message from Deborah Lindsey MD sent at 10/4/2018 10:27 AM EDT -----  MRI shows that he has diffuse fatty liver. We discussed this, but weight loss and reduction in alcohol use is the best treatment for this. The lesion that they noted looks as though it's an area of the liver that doesn't have as much fat and looked different than the surrounding area. No suspicious lesion though or concern for cancer or anything that needs further follow up. Please tell him not to worry.

## 2018-10-24 ENCOUNTER — TELEPHONE (OUTPATIENT)
Dept: INTERNAL MEDICINE | Facility: CLINIC | Age: 43
End: 2018-10-24

## 2018-10-24 NOTE — TELEPHONE ENCOUNTER
Offered patient appt tomorrow, but he declined.  Will go to .    ----- Message from [facility] sent at 10/24/2018 10:15 AM EDT -----  Contact: [phone]  Make him an appt tomorrow with Dr. Vazquez.     ----- Message -----  From: Shelley Castro MA  Sent: 10/24/2018  10:10 AM  To: [facility]    Spoke with patient.  Has only been sick x 2 days and not running fever.  Does have yellow nasal congestion and non-productive cough.  Has not tried any OTC meds yet.  Please advise in Dr. Vazquez's absence.    ----- Message -----  From: Shelley Castro MA  Sent: 10/24/2018   9:52 AM  To: Shelley Castro MA        ----- Message -----  From: Yuly Schrader  Sent: 10/24/2018   9:26 AM  To: Víctor Albarran [facility] [place]    : 73  GEORGE PT.    PATIENT HAS NASAL CONGESTION AND COUGHING. HE WANTS DR. VAZQUEZ TO CALL SOMETHING IN FOR HIM TO [facility].

## 2021-04-02 ENCOUNTER — BULK ORDERING (OUTPATIENT)
Dept: CASE MANAGEMENT | Facility: OTHER | Age: 46
End: 2021-04-02

## 2021-04-02 DIAGNOSIS — Z23 IMMUNIZATION DUE: ICD-10-CM
